# Patient Record
Sex: FEMALE | Race: WHITE | Employment: STUDENT | URBAN - NONMETROPOLITAN AREA
[De-identification: names, ages, dates, MRNs, and addresses within clinical notes are randomized per-mention and may not be internally consistent; named-entity substitution may affect disease eponyms.]

---
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
## 2024-06-07 ENCOUNTER — OFFICE VISIT (OUTPATIENT)
Dept: PODIATRY | Age: 19
End: 2024-06-07
Payer: COMMERCIAL

## 2024-06-07 VITALS — HEART RATE: 66 BPM | DIASTOLIC BLOOD PRESSURE: 72 MMHG | SYSTOLIC BLOOD PRESSURE: 118 MMHG

## 2024-06-07 DIAGNOSIS — E10.69 TYPE 1 DIABETES MELLITUS WITH OTHER SPECIFIED COMPLICATION (HCC): ICD-10-CM

## 2024-06-07 DIAGNOSIS — G11.11 FRIEDREICH ATAXIA (HCC): Primary | ICD-10-CM

## 2024-06-07 DIAGNOSIS — L60.2 OG (ONYCHOGRYPHOSIS): ICD-10-CM

## 2024-06-07 DIAGNOSIS — B35.1 DERMATOPHYTOSIS OF NAIL: ICD-10-CM

## 2024-06-07 PROCEDURE — 11720 DEBRIDE NAIL 1-5: CPT | Performed by: PODIATRIST

## 2024-06-07 PROCEDURE — 99214 OFFICE O/P EST MOD 30 MIN: CPT | Performed by: PODIATRIST

## 2024-06-07 RX ORDER — INSULIN LISPRO 200 [IU]/ML
INJECTION, SOLUTION SUBCUTANEOUS
COMMUNITY
Start: 2024-06-03

## 2024-06-07 RX ORDER — INSULIN GLARGINE 100 [IU]/ML
15 INJECTION, SOLUTION SUBCUTANEOUS
COMMUNITY
Start: 2018-05-07

## 2024-06-07 RX ORDER — LEVOTHYROXINE SODIUM 0.05 MG/1
125 TABLET ORAL
COMMUNITY

## 2024-06-07 RX ORDER — ENOXAPARIN SODIUM 100 MG/ML
60 INJECTION SUBCUTANEOUS EVERY 12 HOURS
COMMUNITY
Start: 2017-07-25

## 2024-06-07 NOTE — PROGRESS NOTES
Erythema absent, bilateral. Distal Rubor absent bilateral.  Temperature decreased bilateral. Hyperpigmentation present bilateral. Atrophic skin yes.   Neurological: Sensation Impaired to light touch to level of digits, bilateral.  Protective sensation intact  10/10 sites via 5.07/10g Malcom-Stephen Monofilament, bilateral.  negative Tinel's, bilateral.  negative Valleix sign, bilateral.  Vibratory abnormal  bilateral.  Reflexes Decreased bilateral.  Paresthesias positive.  Dysthesias negative.  Sharp/dull abnormal  bilateral.   Musculoskeletal: Muscle strength 4/5, bilateral.  Pain absent upon palpation bilateral. Normal medial longitudinal arch, bilateral.  Ankle ROM decreased,bilateral.  1st MPJ ROM within normal limits, bilateral.  Dorsally contracted digits absent. No other foot deformities.   Integument: Warm, dry, supple, bilateral.  Open lesion absent, bilateral.  Mild irritation left medial second toe from the nail deformity on the great toe.  Interdigital maceration absent to web spaces bilateral.  Onychogryphosis left hallux.  Nails left 1 and right none thickened, dystrophic and crumbly, discolored with subungual debris.   Fissures absent, bilateral. Hyperkeratotic tissue is absent.      Asessment: Patient is a 18 y.o. female with:    Diagnosis Orders   1. Friedreich ataxia (HCC)        2. Type 1 diabetes mellitus with other specified complication (HCC)        3. Dermatophytosis of nail        4. OG (onychogryphosis)            Plan: Patient examined and evaluated.  Current condition and treatment options discussed in detail.    All nails as mentioned above debrided in length and thickness.  Stop penalc.  Start urea cream 40% to soften and help maintain.  Patient advised OTC methods for treatment of the mycotic nails.  Patient will follow up in 10 weeks.   Patient is low level medical decision making.  Patient is acute uncomplicated condition.  No new testing or prescription.  Low risk